# Patient Record
Sex: FEMALE | Race: WHITE | Employment: OTHER | URBAN - METROPOLITAN AREA
[De-identification: names, ages, dates, MRNs, and addresses within clinical notes are randomized per-mention and may not be internally consistent; named-entity substitution may affect disease eponyms.]

---

## 2017-04-17 ENCOUNTER — HOSPITAL ENCOUNTER (OUTPATIENT)
Age: 72
Setting detail: SPECIMEN
Discharge: HOME | End: 2017-04-17
Payer: COMMERCIAL

## 2017-04-17 LAB — TSH SERPL DL<=0.05 MIU/L-ACNC: 7.91 MIU/L (ref 0.3–5)

## 2017-04-17 PROCEDURE — 36415 COLL VENOUS BLD VENIPUNCTURE: CPT

## 2017-04-17 PROCEDURE — P9603 ONE-WAY ALLOW PRORATED MILES: HCPCS

## 2017-04-17 PROCEDURE — 84443 ASSAY THYROID STIM HORMONE: CPT

## 2017-05-01 ENCOUNTER — HOSPITAL ENCOUNTER (OUTPATIENT)
Age: 72
Setting detail: SPECIMEN
Discharge: HOME | End: 2017-05-01
Payer: COMMERCIAL

## 2017-05-01 LAB
ALBUMIN SERPL-MCNC: 3.2 G/DL (ref 3.5–5.2)
ALBUMIN/GLOBULIN RATIO: 1.2 (ref 1–2.5)
ALP BLD-CCNC: 52 U/L (ref 35–104)
ALT SERPL-CCNC: 14 U/L (ref 5–33)
ANION GAP SERPL CALCULATED.3IONS-SCNC: 14 MMOL/L (ref 9–17)
AST SERPL-CCNC: 14 U/L
BILIRUB SERPL-MCNC: 0.25 MG/DL (ref 0.3–1.2)
BUN BLDV-MCNC: 20 MG/DL (ref 8–23)
BUN/CREAT BLD: ABNORMAL (ref 9–20)
CALCIUM SERPL-MCNC: 8.8 MG/DL (ref 8.6–10.4)
CHLORIDE BLD-SCNC: 104 MMOL/L (ref 98–107)
CHOLESTEROL/HDL RATIO: 2.7
CHOLESTEROL: 109 MG/DL
CO2: 24 MMOL/L (ref 20–31)
CREAT SERPL-MCNC: 0.67 MG/DL (ref 0.5–0.9)
GFR AFRICAN AMERICAN: >60 ML/MIN
GFR NON-AFRICAN AMERICAN: >60 ML/MIN
GFR SERPL CREATININE-BSD FRML MDRD: ABNORMAL ML/MIN/{1.73_M2}
GFR SERPL CREATININE-BSD FRML MDRD: ABNORMAL ML/MIN/{1.73_M2}
GLUCOSE BLD-MCNC: 92 MG/DL (ref 70–99)
HCT VFR BLD CALC: 37.9 % (ref 36–46)
HDLC SERPL-MCNC: 41 MG/DL
HEMOGLOBIN: 12.9 G/DL (ref 12–16)
LDL CHOLESTEROL: 52 MG/DL (ref 0–130)
MCH RBC QN AUTO: 30.7 PG (ref 26–34)
MCHC RBC AUTO-ENTMCNC: 34.1 G/DL (ref 31–37)
MCV RBC AUTO: 89.9 FL (ref 80–100)
PDW BLD-RTO: 13.9 % (ref 12.5–15.4)
PLATELET # BLD: 159 K/UL (ref 140–450)
PMV BLD AUTO: 8.8 FL (ref 6–12)
POTASSIUM SERPL-SCNC: 4.2 MMOL/L (ref 3.7–5.3)
RBC # BLD: 4.21 M/UL (ref 4–5.2)
SODIUM BLD-SCNC: 142 MMOL/L (ref 135–144)
TOTAL PROTEIN: 5.9 G/DL (ref 6.4–8.3)
TRIGL SERPL-MCNC: 78 MG/DL
TSH SERPL DL<=0.05 MIU/L-ACNC: 5.97 MIU/L (ref 0.3–5)
VITAMIN D 25-HYDROXY: 23.1 NG/ML (ref 30–100)
VLDLC SERPL CALC-MCNC: NORMAL MG/DL (ref 1–30)
WBC # BLD: 6.1 K/UL (ref 3.5–11)

## 2017-05-01 PROCEDURE — 36415 COLL VENOUS BLD VENIPUNCTURE: CPT

## 2017-05-01 PROCEDURE — 85027 COMPLETE CBC AUTOMATED: CPT

## 2017-05-01 PROCEDURE — P9603 ONE-WAY ALLOW PRORATED MILES: HCPCS

## 2017-05-01 PROCEDURE — 80053 COMPREHEN METABOLIC PANEL: CPT

## 2017-05-01 PROCEDURE — 82306 VITAMIN D 25 HYDROXY: CPT

## 2017-05-01 PROCEDURE — 84443 ASSAY THYROID STIM HORMONE: CPT

## 2017-05-01 PROCEDURE — 80061 LIPID PANEL: CPT

## 2017-06-29 ENCOUNTER — HOSPITAL ENCOUNTER (OUTPATIENT)
Age: 72
Setting detail: SPECIMEN
Discharge: HOME | End: 2017-06-29
Payer: COMMERCIAL

## 2017-06-29 LAB
ESTIMATED AVERAGE GLUCOSE: 111 MG/DL
HBA1C MFR BLD: 5.5 % (ref 4–6)

## 2017-06-29 PROCEDURE — 36415 COLL VENOUS BLD VENIPUNCTURE: CPT

## 2017-06-29 PROCEDURE — 83036 HEMOGLOBIN GLYCOSYLATED A1C: CPT

## 2017-06-29 PROCEDURE — P9603 ONE-WAY ALLOW PRORATED MILES: HCPCS

## 2017-07-28 ENCOUNTER — HOSPITAL ENCOUNTER (OUTPATIENT)
Age: 72
Setting detail: SPECIMEN
Discharge: HOME | End: 2017-07-28
Payer: COMMERCIAL

## 2017-07-28 LAB
ANION GAP SERPL CALCULATED.3IONS-SCNC: 17 MMOL/L (ref 9–17)
BUN BLDV-MCNC: 23 MG/DL (ref 8–23)
BUN/CREAT BLD: ABNORMAL (ref 9–20)
CALCIUM SERPL-MCNC: 9.9 MG/DL (ref 8.6–10.4)
CHLORIDE BLD-SCNC: 105 MMOL/L (ref 98–107)
CO2: 25 MMOL/L (ref 20–31)
CREAT SERPL-MCNC: 0.78 MG/DL (ref 0.5–0.9)
GFR AFRICAN AMERICAN: >60 ML/MIN
GFR NON-AFRICAN AMERICAN: >60 ML/MIN
GFR SERPL CREATININE-BSD FRML MDRD: ABNORMAL ML/MIN/{1.73_M2}
GFR SERPL CREATININE-BSD FRML MDRD: ABNORMAL ML/MIN/{1.73_M2}
GLUCOSE BLD-MCNC: 101 MG/DL (ref 70–99)
POTASSIUM SERPL-SCNC: 4.3 MMOL/L (ref 3.7–5.3)
SODIUM BLD-SCNC: 147 MMOL/L (ref 135–144)

## 2017-07-28 PROCEDURE — 80048 BASIC METABOLIC PNL TOTAL CA: CPT

## 2017-07-28 PROCEDURE — 36415 COLL VENOUS BLD VENIPUNCTURE: CPT

## 2017-07-28 PROCEDURE — P9603 ONE-WAY ALLOW PRORATED MILES: HCPCS

## 2017-10-05 ENCOUNTER — OFFICE VISIT (OUTPATIENT)
Dept: NEUROLOGY | Age: 72
End: 2017-10-05
Payer: COMMERCIAL

## 2017-10-05 VITALS
HEIGHT: 64 IN | HEART RATE: 68 BPM | DIASTOLIC BLOOD PRESSURE: 91 MMHG | WEIGHT: 240 LBS | SYSTOLIC BLOOD PRESSURE: 193 MMHG | BODY MASS INDEX: 40.97 KG/M2

## 2017-10-05 DIAGNOSIS — I62.00 SUBDURAL BLEEDING (HCC): ICD-10-CM

## 2017-10-05 DIAGNOSIS — R53.1 LEFT-SIDED WEAKNESS: ICD-10-CM

## 2017-10-05 DIAGNOSIS — I63.30 CEREBROVASCULAR ACCIDENT (CVA) DUE TO THROMBOSIS OF CEREBRAL ARTERY (HCC): Primary | ICD-10-CM

## 2017-10-05 PROCEDURE — 99214 OFFICE O/P EST MOD 30 MIN: CPT | Performed by: NURSE PRACTITIONER

## 2017-10-05 RX ORDER — LOPERAMIDE HYDROCHLORIDE 2 MG/1
2 TABLET ORAL 4 TIMES DAILY PRN
COMMUNITY
End: 2019-04-29 | Stop reason: ALTCHOICE

## 2017-10-05 RX ORDER — LEVETIRACETAM 500 MG/1
500 TABLET ORAL DAILY
Qty: 60 TABLET | Refills: 0 | Status: SHIPPED | OUTPATIENT
Start: 2017-10-05 | End: 2018-04-09 | Stop reason: ALTCHOICE

## 2017-10-05 RX ORDER — SERTRALINE HYDROCHLORIDE 25 MG/1
25 TABLET, FILM COATED ORAL DAILY
COMMUNITY

## 2017-10-05 RX ORDER — ACETAMINOPHEN 500 MG
1000 TABLET ORAL EVERY 6 HOURS PRN
COMMUNITY

## 2017-10-05 RX ORDER — LEVOTHYROXINE SODIUM 0.12 MG/1
125 TABLET ORAL DAILY
COMMUNITY

## 2017-10-05 RX ORDER — PANTOPRAZOLE SODIUM 40 MG/1
40 TABLET, DELAYED RELEASE ORAL DAILY
COMMUNITY

## 2017-10-05 NOTE — MR AVS SNAPSHOT
percent of your current weight) by decreasing your calorie intake and becoming more physically active will help lower your risk of developing or worsening diseases associated with obesity. Learn more at: [employer]             Today's Medication Changes          These changes are accurate as of: 10/5/17 10:46 AM.  If you have any questions, ask your nurse or doctor. CHANGE how you take these medications           levETIRAcetam 500 MG tablet   Commonly known as:  KEPPRA   Instructions: Take 1 tablet by mouth daily   Quantity:  60 tablet   Refills:  0   What changed:  when to take this   Changed by:  Arnulfo Ralph CNP         STOP taking these medications           omeprazole 40 MG delayed release capsule   Commonly known as:  PRILOSEC   Stopped by:  Arnulfo Ralph CNP            Where to Get Your Medications      You can get these medications from any pharmacy     Bring a paper prescription for each of these medications     levETIRAcetam 500 MG tablet               Your Current Medications Are              loperamide (IMODIUM A-D) 2 MG tablet Take 2 mg by mouth 4 times daily as needed for Diarrhea    levothyroxine (SYNTHROID) 125 MCG tablet Take 125 mcg by mouth Daily    sertraline (ZOLOFT) 25 MG tablet Take 25 mg by mouth daily    pantoprazole (PROTONIX) 40 MG tablet Take 40 mg by mouth daily    acetaminophen (TYLENOL) 500 MG tablet Take 1,000 mg by mouth every 6 hours as needed for Pain    levETIRAcetam (KEPPRA) 500 MG tablet Take 1 tablet by mouth daily    atorvastatin (LIPITOR) 20 MG tablet TAKE ONE (1) TABLET BY MOUTH EVERY NIGHT AT BEDTIME    aspirin 81 MG EC tablet Take 1 tablet by mouth daily. vitamin D (ERGOCALCIFEROL) 51453 UNITS capsule Take 1 capsule by mouth once a week. propafenone (RHTHYMOL) 150 MG tablet Take 150 mg by mouth every 8 hours.         Allergies           No Known Allergies         Additional Information Basic Information     Date Of Birth Sex Race Ethnicity Preferred Language    1945 Female White Non-/Non  English      Problem List as of 10/5/2017  Date Reviewed: 2/20/2016                Left-sided weakness    Subdural bleeding (HCC) (s/p subdural evac 2/20/2016)    History of TIA (transient ischemic attack) and stroke    E. coli UTI    Atrial fibrillation (HCC)    High cholesterol    History of blood clots    Arthritis    Cerebral artery occlusion with cerebral infarction ([address]    Sleep apnea    Diabetes mellitus (HCC)    Hypokalemia    History of cerebral infarction    Left hemiparesis (HCC)    Cerebral aneurysm    Recurrent falls    White coat hypertension    Cerebrovascular disease    Paroxysmal atrial fibrillation    Gait difficulty    History of GI bleed    Acute blood loss anemia    Warfarin-induced coagulopathy (HCC)    Vitamin D deficiency    Lower urinary tract infectious disease    Memory loss    Stroke ([facility]    Hypertension    HLD (hyperlipidemia)    Pneumonia      Preventive Care        Date Due    Diabetic Foot Exam 2/6/1953    Eye Exam By An Eye Doctor 2/6/1955    Tetanus Combination Vaccine (1 - Tdap) 2/6/1962    Zoster Vaccine 2/6/2003    Pneumococcal Vaccines (two) for all adults aged 72 and over (1 of 2 - PCV13) 2/6/2008    Urine Check For Kidney Problems 2/27/2013    Colon Cancer Stool Test 11/13/2013    Yearly Flu Vaccine (1) 9/1/2017    Cholesterol Screening 5/1/2018    Hemoglobin A1C (Test For Long-Term Glucose Control) 6/29/2018    Mammograms are recommended every 2 years for low/average risk patients aged 48 - 69, and every year for high risk patients per updated national guidelines. However these guidelines can be individualized by your provider. 9/13/2018            [employer] Signup           [employer] allows you to send messages to your doctor, view your test results, renew your prescriptions, schedule appointments, view visit notes, and more. How Do I Sign Up? 1. In your Internet browser, go to [email or web address]  2. Click on the Sign Up Now link in the Sign In box. You will see the New Member Sign Up page. 3. Enter your [employer] Access Code exactly as it appears below. You will not need to use this code after youve completed the sign-up process. If you do not sign up before the expiration date, you must request a new code. [employer] Access Code: [email or web address]  Expires: 12/4/2017 10:05 AM    4. Enter your Social Security Number ([id number]) and Date of Birth (mm/dd/yyyy) as indicated and click Submit. You will be taken to the next sign-up page. 5. Create a [employer] ID. This will be your [employer] login ID and cannot be changed, so think of one that is secure and easy to remember. 6. Create a [employer] password. You can change your password at any time. 7. Enter your Password Reset Question and Answer. This can be used at a later time if you forget your password. 8. Enter your e-mail address. You will receive e-mail notification when new information is available in [address] 9. Click Sign Up. You can now view your medical record. Additional Information  If you have questions, please contact the physician practice where you receive care. Remember, [employer] is NOT to be used for urgent needs. For medical emergencies, dial 911. For questions regarding your [employer] account call [phone]. If you have a clinical question, please call your doctor's office.

## 2017-10-05 NOTE — LETTER
October 5, 2017     Roberto Rueda, [address] [facility] [address]    Patient: Nemesio Villafana  MR Number: [id number]  YOB: 1945  Date of Visit: 10/5/2017    Dear Dr. 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 Patient has been seen in the past by Dr. Nir Hilton and was last seen on 11/29/16. Patient is currently living in a nursing home, but is relatively independent and having balance issues. Her original stroke was in 2012. 2016 as a result of a fall, she sustained bilateral subdural hematomas,, with bilateral craniotomy in February 2016 for evacuation of hematoma by . She was placed on Keppra during her hospitalization prophylactically. There is no record of seizure activity while in the hospital were since discharge. Patient had been on Coumadin at one point by Cynthia England, her cardiologist, told her that she needed no further anticoagulation therapy. She comes today for routine follow-up. She is doing well with residual left hemiparesis. There are no headaches. There've been no seizures reported. Current medications include aspirin 81 mg daily atorvastatin 20 mg daily vitamin D 50,000 units weekly, Keppra 500 mg twice daily,Synthroid 125 µg daily Rythmol 150 mg every 8 hours and Zoloft 25 mg daily  .       Past Medical History:   Diagnosis Date    Arthritis     Atrial fibrillation ([facility]     Diabetes mellitus ([facility]     Diverticulitis     High cholesterol     History of blood clots     Hypertension 11/3/2011    Osteoporosis     Sleep apnea     Subdural hematoma, post-traumatic ([address]  TIA (transient ischemic attack)     Unspecified cerebral artery occlusion with cerebral infarction [facility])          Past Surgical History:   Procedure Laterality Date    ANKLE FRACTURE SURGERY      right -pin    CARPAL TUNNEL RELEASE      COLONOSCOPY      CORONARY ANGIOPLASTY WITH STENT PLACEMENT      JOINT REPLACEMENT      right thumb    SUBDURAL HEMORRHAGE DRAINAGE Bilateral 02/20/2016    TONSILLECTOMY      UPPER GASTROINTESTINAL ENDOSCOPY         Family History   Problem Relation Age of Onset    Heart Disease Mother     Heart Disease Father     Diabetes Maternal Grandmother     High Blood Pressure Brother     Diabetes Paternal Aunt        Social History   Substance Use Topics    Smoking status: Never Smoker    Smokeless tobacco: Never Used    Alcohol use No                               REVIEW OF SYSTEMS    CONSTITUTIONAL Weight: present, Appetite: absent, Fatigue: absent      HEENT Ears: normal, Eyes: normal, Visual disturbance: absent   RESPIRATORY Shortness of breath: absent, Cough: absent   CARDIOVASCULAR Chest pain: absent, Leg swelling :present      GI Constipation: absent, Diarrhea: absent, Swallowing change: absent       Urinary frequency: absent, Urinary urgency: absent, Urinary incontinence: absent   MUSCULOSKELETAL Neck pain: absent, Back pain: absent, Stiffness: absent, Muscle pain: absent, Joint pain: absent Restless legs: absent   DERMATOLOGIC Hair loss: absent, Skin changes: absent   NEUROLOGIC Memory loss: absent, Confusion: absent, Seizures: absent Trouble walking or imbalance: present, Dizziness: absent, Weakness: absent, Numbness: absent Tremor: absent, Spasm: absent, Speech difficulty: absent, Headache: absent, Light sensitivity: absent   PSYCHIATRIC Anxiety: absent, Hallucination: absent, Mood disorder: absent   HEMATOLOGIC Abnormal bleeding: absent, Anemia: absent, Clotting disorder: absent, Lymph gland changes: absent           No Known Allergies 3. Patient will return in follow-up with Dr. Lesly Redding in 6 months  2. Bilateral subdural hematomas in February 2016, status post bilateral craniotomy  1. Patient was placed on Keppra 500 mg twice daily prophylactically following her craniotomy in February 2016. 2. After a long discussion regarding the medication, the decision was made to wean her to 500 mg one daily for 1 month and then stop  3. Patient will notify the office immediately if any seizures occur. 3. Small right CARLA aneurysm, previously followed by             Signed: Srinivasa Foy CNP      If you have questions, please do not hesitate to call me. I look forward to following Michele Oleary along with you.     Sincerely,        Cora Harrison CNP

## 2017-10-05 NOTE — PROGRESS NOTES
RELEASE      COLONOSCOPY      CORONARY ANGIOPLASTY WITH STENT PLACEMENT      JOINT REPLACEMENT      right thumb    SUBDURAL HEMORRHAGE DRAINAGE Bilateral 02/20/2016    TONSILLECTOMY      UPPER GASTROINTESTINAL ENDOSCOPY         Family History   Problem Relation Age of Onset    Heart Disease Mother     Heart Disease Father     Diabetes Maternal Grandmother     High Blood Pressure Brother     Diabetes Paternal Aunt        Social History   Substance Use Topics    Smoking status: Never Smoker    Smokeless tobacco: Never Used    Alcohol use No                               REVIEW OF SYSTEMS    CONSTITUTIONAL Weight: present, Appetite: absent, Fatigue: absent      HEENT Ears: normal, Eyes: normal, Visual disturbance: absent   RESPIRATORY Shortness of breath: absent, Cough: absent   CARDIOVASCULAR Chest pain: absent, Leg swelling :present      GI Constipation: absent, Diarrhea: absent, Swallowing change: absent       Urinary frequency: absent, Urinary urgency: absent, Urinary incontinence: absent   MUSCULOSKELETAL Neck pain: absent, Back pain: absent, Stiffness: absent, Muscle pain: absent, Joint pain: absent Restless legs: absent   DERMATOLOGIC Hair loss: absent, Skin changes: absent   NEUROLOGIC Memory loss: absent, Confusion: absent, Seizures: absent Trouble walking or imbalance: present, Dizziness: absent, Weakness: absent, Numbness: absent Tremor: absent, Spasm: absent, Speech difficulty: absent, Headache: absent, Light sensitivity: absent   PSYCHIATRIC Anxiety: absent, Hallucination: absent, Mood disorder: absent   HEMATOLOGIC Abnormal bleeding: absent, Anemia: absent, Clotting disorder: absent, Lymph gland changes: absent           No Known Allergies        Current Outpatient Prescriptions   Medication Sig Dispense Refill    levothyroxine (SYNTHROID) 100 MCG tablet Take 100 mcg by mouth Daily      amLODIPine (NORVASC) 5 MG tablet Take 5 mg by mouth daily      Magnesium Hydroxide (MILK OF MAGNESIA and time. Attention: normal.   Speech: speech is normal   Level of consciousness: alert  Normal comprehension. Cranial Nerves     CN II   Visual fields full to confrontation. CN III, IV, VI   Pupils are equal, round, and reactive to light. Extraocular motions are normal.     CN V   Facial sensation intact. CN VII   Facial expression full, symmetric. CN VIII   CN VIII normal.     CN IX, X   CN IX normal.     CN XI   CN XI normal.     CN XII   CN XII normal.     Motor Exam   Muscle bulk: normal  Overall muscle tone: normal  Right arm pronator drift: absent    Strength   Strength 5/5 except as noted. Left deltoid: 4/5  Left biceps: 4/5  Left triceps: 4/5  Left wrist flexion: 4/5  Left wrist extension: 4/5  Left interossei: 4/5  Left iliopsoas: 4/5    Sensory Exam   Light touch normal.   Vibration normal.   Pinprick normal.     Gait, Coordination, and Reflexes     Gait  Gait: normal    Coordination   Finger to nose coordination: normal    Tremor   Resting tremor: absent    Reflexes   Right brachioradialis: 2+  Left brachioradialis: 2+  Right biceps: 2+  Left biceps: 2+  Right triceps: 2+  Left triceps: 2+  Right patellar: 2+  Left patellar: 2+  Right achilles: 2+  Left achilles: 2+  Right plantar: normal  Left plantar: upgoing            ASSESSMENT/PLAN:       In summary, your patient, Lluvia Serna exhibits the following, with associated plan:    1. Previous right subcortical stroke with residual left hemiparesis  1. She will be maintained on aspirin and atorvastatin for secondary stroke prevention  2. She continues to wear a support brace on her left shoulder to prevent subluxation  3. Patient will return in follow-up with Dr. Lang Sharp in 6 months  2. Bilateral subdural hematomas in February 2016, status post bilateral craniotomy  1. Patient was placed on Keppra 500 mg twice daily prophylactically following her craniotomy in February 2016.   2. After a long discussion regarding the medication, the decision was made to wean her to 500 mg one daily for 1 month and then stop  3. Patient will notify the office immediately if any seizures occur.   3. Small right CARLA aneurysm, previously followed by             Signed: Rodolfo Salinas CNP

## 2017-10-23 ENCOUNTER — TELEPHONE (OUTPATIENT)
Dept: NEUROLOGY | Age: 72
End: 2017-10-23

## 2017-10-23 NOTE — TELEPHONE ENCOUNTER
Suhas, nurse, with [address] [place] [address] called about the [address]. She said that Daniel Rees has not had any Jossie Mercury for at least five days. Erin Sauer has been off for a few days and has not worked with Ronald Stapleton in a while. When the order to reduce the dose came the nurse on duty apparently sent the [address] that Ronald Stapleton had back to the pharmacy? ? . Now the pharmacy will not send any Keppra back because the insurance will not cover it. You were weaning Ronald Stapleton off the [address]. She has not had any seizure activity or any other problems without the Keppra. Please advise if you want her to take it daily for the month or is it ok that she be off it now?

## 2018-04-09 ENCOUNTER — OFFICE VISIT (OUTPATIENT)
Dept: NEUROLOGY | Age: 73
End: 2018-04-09
Payer: COMMERCIAL

## 2018-04-09 VITALS
SYSTOLIC BLOOD PRESSURE: 155 MMHG | BODY MASS INDEX: 43.02 KG/M2 | WEIGHT: 252 LBS | DIASTOLIC BLOOD PRESSURE: 68 MMHG | HEART RATE: 87 BPM | HEIGHT: 64 IN

## 2018-04-09 DIAGNOSIS — S06.5XAA SUBDURAL HEMATOMA: ICD-10-CM

## 2018-04-09 DIAGNOSIS — I63.30 CEREBROVASCULAR ACCIDENT (CVA) DUE TO THROMBOSIS OF CEREBRAL ARTERY (HCC): Primary | ICD-10-CM

## 2018-04-09 DIAGNOSIS — I67.9 CEREBROVASCULAR DISEASE: ICD-10-CM

## 2018-04-09 DIAGNOSIS — G81.94 LEFT HEMIPARESIS (HCC): ICD-10-CM

## 2018-04-09 PROCEDURE — 99214 OFFICE O/P EST MOD 30 MIN: CPT | Performed by: NURSE PRACTITIONER

## 2018-04-09 RX ORDER — BENZONATATE 100 MG/1
100 CAPSULE ORAL EVERY 4 HOURS PRN
COMMUNITY
End: 2019-04-29 | Stop reason: ALTCHOICE

## 2018-04-09 RX ORDER — TOLTERODINE 4 MG/1
4 CAPSULE, EXTENDED RELEASE ORAL DAILY
COMMUNITY

## 2018-04-09 RX ORDER — CYCLOBENZAPRINE HCL 10 MG
10 TABLET ORAL NIGHTLY
COMMUNITY

## 2018-04-09 RX ORDER — ALENDRONATE SODIUM 70 MG/1
70 TABLET ORAL
COMMUNITY

## 2018-04-09 RX ORDER — HYDRALAZINE HYDROCHLORIDE 50 MG/1
50 TABLET, FILM COATED ORAL 2 TIMES DAILY
COMMUNITY

## 2018-04-09 RX ORDER — AMLODIPINE BESYLATE 5 MG/1
5 TABLET ORAL DAILY
COMMUNITY

## 2018-04-09 RX ORDER — ONDANSETRON HYDROCHLORIDE 8 MG/1
8 TABLET, FILM COATED ORAL EVERY 8 HOURS PRN
COMMUNITY
End: 2019-04-29 | Stop reason: ALTCHOICE

## 2019-04-29 ENCOUNTER — OFFICE VISIT (OUTPATIENT)
Dept: NEUROLOGY | Age: 74
End: 2019-04-29
Payer: MEDICARE

## 2019-04-29 VITALS
HEIGHT: 64 IN | HEART RATE: 86 BPM | BODY MASS INDEX: 43.54 KG/M2 | SYSTOLIC BLOOD PRESSURE: 154 MMHG | WEIGHT: 255 LBS | DIASTOLIC BLOOD PRESSURE: 81 MMHG

## 2019-04-29 DIAGNOSIS — I67.1 CEREBRAL ANEURYSM: ICD-10-CM

## 2019-04-29 DIAGNOSIS — I67.9 CEREBROVASCULAR DISEASE: Primary | ICD-10-CM

## 2019-04-29 DIAGNOSIS — G81.94 LEFT HEMIPARESIS (HCC): ICD-10-CM

## 2019-04-29 DIAGNOSIS — Z86.79 HISTORY OF SUBDURAL HEMATOMA: ICD-10-CM

## 2019-04-29 PROCEDURE — 99214 OFFICE O/P EST MOD 30 MIN: CPT | Performed by: NURSE PRACTITIONER

## 2019-04-29 NOTE — PROGRESS NOTES
[facility]            [address]          [place], [address] [facility]          Dept: [phone]          Dept Fax: [phone]        MD Penelope Yoder MD Ahmed B. Darnella Freed, MD Theodora Arista, MD Morey Barer, MD Tiffany Prudent, CNP            4/29/2019      HISTORY OF PRESENT ILLNESS:       I had the pleasure of seeing Carmita Dey, who returns for continuing neurologic care. Patient is a 61-year-old woman who was seen last on April 9, 2018 for follow-up from a previous stroke, bilateral subdural hematomas and an anterior communicating artery aneurysm. Patients a new partial stroke was in February 2012 and in 2016, as a result of a fall, she sustained bilateral subdural hematomas. Patient underwent a bilateral craniotomy in February 2016 for evacuation of the subdural hematomas. She was initially placed on Keppra prophylactically during her hospitalization. There is no seizure activity while in the hospital or following her discharge. At her October 2017 visit, the patients Keppra was discontinued. The patient had also previously been on Coumadin, but her cardiologist told her that she no longer needed the anticoagulation therapy. The patient continues on aspirin 81 mg daily and Crestor 5 mg for secondary stroke prevention. There was also a small incidental anterior communicating artery aneurysm discovered during her hospitalization in 2013. Patient is here today for reevaluation and continues to live in a skilled nursing facility. She fractured her femur on the left since the last time she was here related to a fall. She is continued on the same medications. She uses a wheelchair most of the time rather than trying to ambulate with a walker due to safety.   She continues to have left hemiparesis more so in the arm than the leg, but she has difficulty with a right thumb    SUBDURAL HEMORRHAGE DRAINAGE Bilateral 02/20/2016    TONSILLECTOMY      UPPER GASTROINTESTINAL ENDOSCOPY          SOCIAL HISTORY:     Social History     Socioeconomic History    Marital status:      Spouse name: Not on file    Number of children: Not on file    Years of education: Not on file    Highest education level: Not on file   Occupational History    Occupation: radiation theapist/x-ray tech     Employer: [address] [place] [address] Street: retired   Social Needs    Financial resource strain: Not on file    Food insecurity:     Worry: Not on file     Inability: Not on file   [employer] needs:     Medical: Not on file     Non-medical: Not on file   Tobacco Use    Smoking status: Never Smoker    Smokeless tobacco: Never Used   Substance and Sexual Activity    Alcohol use: No     Alcohol/week: 0.0 oz    Drug use: No    Sexual activity: Not on file   Lifestyle    Physical activity:     Days per week: Not on file     Minutes per session: Not on file    Stress: Not on file   Relationships    Social connections:     Talks on phone: Not on file     Gets together: Not on file     Attends Spiritism service: Not on file     Active member of club or organization: Not on file     Attends meetings of clubs or organizations: Not on file     Relationship status: Not on file    Intimate partner violence:     Fear of current or ex partner: Not on file     Emotionally abused: Not on file     Physically abused: Not on file     Forced sexual activity: Not on file   Other Topics Concern    Not on file   Social History Narrative    Not on file       CURRENT MEDICATIONS:     Current Outpatient Medications   Medication Sig Dispense Refill    cyclobenzaprine (FLEXERIL) 10 MG tablet Take 10 mg by mouth nightly      tolterodine (DETROL LA) 4 MG extended release capsule Take 4 mg by mouth daily      alendronate (FOSAMAX) 70 MG tablet Take 70 mg by mouth every 7 days Once every wednesday  hydrALAZINE (APRESOLINE) 50 MG tablet Take 50 mg by mouth 2 times daily      amLODIPine (NORVASC) 5 MG tablet Take 5 mg by mouth daily      levothyroxine (SYNTHROID) 125 MCG tablet Take 125 mcg by mouth Daily      sertraline (ZOLOFT) 25 MG tablet Take 25 mg by mouth daily      pantoprazole (PROTONIX) 40 MG tablet Take 40 mg by mouth daily      acetaminophen (TYLENOL) 500 MG tablet Take 1,000 mg by mouth every 6 hours as needed for Pain      aspirin 81 MG EC tablet Take 1 tablet by mouth daily. 30 tablet 0    vitamin D (ERGOCALCIFEROL) 72207 UNITS capsule Take 1 capsule by mouth once a week. 30 capsule 0    propafenone (RHTHYMOL) 150 MG tablet Take 150 mg by mouth every 8 hours.  ondansetron (ZOFRAN) 8 MG tablet Take 8 mg by mouth every 8 hours as needed for Nausea or Vomiting      benzonatate (TESSALON) 100 MG capsule Take 100 mg by mouth every 4 hours as needed for Cough      loperamide (IMODIUM A-D) 2 MG tablet Take 2 mg by mouth 4 times daily as needed for Diarrhea      atorvastatin (LIPITOR) 20 MG tablet TAKE ONE (1) TABLET BY MOUTH EVERY NIGHT AT BEDTIME 30 tablet 5     No current facility-administered medications for this visit.          ALLERGIES:     Allergies   Allergen Reactions    Penicillins                                                    REVIEW OF SYSTEMS    CONSTITUTIONAL Weight: absent, Appetite: absent, Fatigue: absent      HEENT Ears: diminished, Visual disturbance: absent   RESPIRATORY Shortness of breath: absent, Cough: absent   CARDIOVASCULAR Chest pain: absent, Leg swelling :present      GI Constipation: absent, Diarrhea: absent, Swallowing change: absent       Urinary frequency: absent, Urinary urgency: absent, Urinary incontinence: absent   MUSCULOSKELETAL Neck pain: absent, Back pain: absent, Stiffness: absent, Muscle pain: absent, Joint pain: absent Restless legs: absent   DERMATOLOGIC Hair loss: absent, Skin changes: absent   NEUROLOGIC Memory loss: absent, Confusion: absent, Seizures: absent Trouble walking or imbalance: present, Dizziness: absent, Weakness: absent, Numbness: absent Tremor: absent, Spasm: absent, Speech difficulty: absent, Headache: absent, Light sensitivity: absent   PSYCHIATRIC Anxiety: absent, Hallucination: absent, Mood disorder: absent   HEMATOLOGIC Abnormal bleeding: absent, Anemia: absent, Clotting disorder: absent, Lymph gland changes: absent           PHYSICAL EXAMINATION:       There were no vitals filed for this visit.                                            .                                                                                                    General Appearance:  Alert, cooperative, no signs of distress, appears stated age   Head:  Normocephalic, no signs of trauma   Eyes:  Conjunctiva/corneas clear;  eyelids intact   Ears:  Normal external ear and canals   Nose: Nares normal, mucosa normal, no drainage    Throat: Lips and tongue normal; teeth normal;  gums normal   Neck: Supple, intact flexion, extension and rotation;   trachea midline;  no adenopathy;   thyroid: not enlarged;   no carotid pulse abnormality   Back:   Symmetric, no curvature, ROM adequate   Lungs:   Respirations unlabored   Heart:  Regular rate and rhythm           Extremities: Extremities normal, no cyanosis, no edema   Pulses: Symmetric over head and neck   Skin: Skin color, texture normal, no rashes, no lesions                                     NEUROLOGIC EXAMINATION    Neurologic Exam  Mental status    Alert and oriented x 3; intact memory with no confusion, speech or language problems; no hallucinations or delusions  Fund of information appropriate for level of education    Cranial nerves    II - visual fields intact to confrontation  III, IV, VI - extra-ocular muscles full: no pupillary defect; no SUNNY, no nystagmus, no ptosis   V - normal facial sensation                                                               VII - normal facial symmetry VIII - intact hearing                                                                             IX, X - symmetrical palate                                                                  XI - symmetrical shoulder shrug                                                       XII - tongue midline without atrophy or fasciculation      Motor function  Left arm 4/5, left leg 5/5 with 4/5 left anterior tibialis     Sensory function Intact to light touch, pinprick, vibration, proprioception on all 4 extremities      Cerebellar Intact fine motor movement. No involuntary movements or tremors. No ataxia or dysmetria on finger to nose or heel to shin testing      Reflex function DTR 2+ on bilateral UE and LE, symmetric. Negative Babinski      Gait                   not assessed                  ASSESSMENT AND PLAN:           In summary, your patient, Shar Parent exhibits the following, with associated plan:    1. Cerebrovascular disease, with previous right subcortical stroke with residual left hemiparesis  1. Continue aspirin 81 mg daily and Crestor 5 mg daily for secondary stroke prevention  2. Patient will continue to follow up on an annual basis  2. Previous bilateral subdural hematomas in February 2016, status post bilateral craniotomy  3. Status post small right anterior communicating artery aneurysm  1.  I recommended repeat imaging studies, however at this time the patient declines            Signed: Sj Alcazar CNP      *Please note that portions of this note were completed with a voice recognition program.  Although every effort was made to insure the accuracy of this automated transcription, some errors in transcription may have occurred, occasionally words and are mis-transcribed

## 2021-03-20 NOTE — TELEPHONE ENCOUNTER
I spoke with Suhas and gave her this information. She voiced understanding and will D/C the Keppra. They will call if there is any seizure activity or other problems with this. Report given to [address] [place] [address] team. Questions answered appropriately. Pt stable for transfer at this time.